# Patient Record
Sex: MALE | Race: WHITE | NOT HISPANIC OR LATINO | Employment: FULL TIME | URBAN - METROPOLITAN AREA
[De-identification: names, ages, dates, MRNs, and addresses within clinical notes are randomized per-mention and may not be internally consistent; named-entity substitution may affect disease eponyms.]

---

## 2017-12-18 ENCOUNTER — APPOINTMENT (OUTPATIENT)
Dept: RADIOLOGY | Facility: MEDICAL CENTER | Age: 47
End: 2017-12-18
Payer: COMMERCIAL

## 2017-12-18 ENCOUNTER — GENERIC CONVERSION - ENCOUNTER (OUTPATIENT)
Dept: OTHER | Facility: OTHER | Age: 47
End: 2017-12-18

## 2017-12-18 DIAGNOSIS — M75.101 RIGHT ROTATOR CUFF TEAR: ICD-10-CM

## 2017-12-18 DIAGNOSIS — M54.2 CERVICALGIA: ICD-10-CM

## 2017-12-18 PROCEDURE — 73030 X-RAY EXAM OF SHOULDER: CPT

## 2017-12-18 PROCEDURE — 72040 X-RAY EXAM NECK SPINE 2-3 VW: CPT

## 2017-12-26 ENCOUNTER — GENERIC CONVERSION - ENCOUNTER (OUTPATIENT)
Dept: OTHER | Facility: OTHER | Age: 47
End: 2017-12-26

## 2018-01-23 NOTE — MISCELLANEOUS
Message  left message for the results of his shoulder and neck        Signatures   Electronically signed by : Ashley Alberto DO; Dec 26 2017 12:48PM EST                       (Author)

## 2018-01-24 VITALS
DIASTOLIC BLOOD PRESSURE: 82 MMHG | HEIGHT: 71 IN | WEIGHT: 194 LBS | TEMPERATURE: 97.6 F | SYSTOLIC BLOOD PRESSURE: 116 MMHG | BODY MASS INDEX: 27.16 KG/M2 | OXYGEN SATURATION: 99 % | HEART RATE: 103 BPM | RESPIRATION RATE: 16 BRPM

## 2018-02-08 RX ORDER — CYCLOBENZAPRINE HCL 10 MG
1 TABLET ORAL
COMMUNITY
Start: 2017-12-18

## 2018-02-08 RX ORDER — NABUMETONE 750 MG/1
1 TABLET, FILM COATED ORAL 2 TIMES DAILY
COMMUNITY
Start: 2017-12-18

## 2018-02-08 NOTE — PROGRESS NOTES
Assessment:  1  Cervical disc disorder with radiculopathy of mid-cervical region    2  Chronic right shoulder pain    3  Myofascial pain syndrome        Plan:  The patient's symptoms, history/physical are consistent with pain that is multifactorial in origin but predominantly the result of his underlying C6-7 bulging disc which is likely leading to a right-sided radiculopathy as well as concomitant right shoulder pathology likely rotator cuff tendinitis  At this time, the patient was instructed to start taking the cyclobenzaprine 10 mg at bedtime for the spasms  I will also start him in physical therapy for isometric Neck strengthening, scapular stabilization as well as massage/myofascial release which he will defer 6 weeks  He will follow back up with me in 6 weeks for re-evaluation  My impressions and treatment recommendations were discussed in detail with the patient who verbalized understanding and had no further questions  Discharge instructions were provided  I personally saw and examined the patient and I agree with the above discussed plan of care  Orders Placed This Encounter   Procedures    Ambulatory referral to Physical Therapy     Standing Status:   Future     Standing Expiration Date:   8/9/2018     Referral Priority:   Routine     Referral Type:   Physical Therapy     Referral Reason:   Specialty Services Required     Requested Specialty:   Physical Therapy     Number of Visits Requested:   1     Expiration Date:   2/9/2019     New Medications Ordered This Visit   Medications    cyclobenzaprine (FLEXERIL) 10 mg tablet     Sig: Take 1 tablet by mouth daily at bedtime as needed for muscle spasms     nabumetone (RELAFEN) 750 mg tablet     Sig: Take 1 tablet by mouth Twice daily    sertraline (ZOLOFT) 50 mg tablet     Sig: Take 1 tablet by mouth daily       History of Present Illness:    Maria De Jesus Hernandez is a 49 y o  male who presents for consultation in regards to neck pain   Symptoms have been present for 28 years related to a remote history of an injury in 200  Pain is moderate to severe rated 8 to 10/10 on a numeric rating scale and felt nearly constantly but worst in the morning  Symptoms are burning, sharp, dull, aching, cramping in his neck and radiates into the right upper back and shoulder with numbness  Symptoms are aggravated with turning his head and lying down  There is no change with exercise relaxation  Treatment history has included heat/ice which provides moderate relief  He has tried over-the-counter analgesics without relief  He was prescribed cyclobenzaprine and nabumetone by Dr Satnam Adams but he is not taking either medication  He has been referred here for further treatment  I have personally reviewed and/or updated the patient's past medical history, past surgical history, family history, social history, current medications, allergies, and vital signs today  Review of Systems:    Review of Systems   Constitutional: Negative for fever and unexpected weight change  HENT: Negative for trouble swallowing  Eyes: Negative for visual disturbance  Respiratory: Negative for shortness of breath and wheezing  Cardiovascular: Negative for chest pain and palpitations  Gastrointestinal: Negative for constipation, diarrhea, nausea and vomiting  Endocrine: Negative for cold intolerance, heat intolerance and polydipsia  Genitourinary: Negative for difficulty urinating and frequency  Musculoskeletal: Positive for arthralgias  Negative for joint swelling and myalgias  Skin: Negative for rash  Neurological: Positive for dizziness  Negative for seizures, syncope, weakness and headaches  Memory Loss   Hematological: Does not bruise/bleed easily  Psychiatric/Behavioral: Negative for dysphoric mood  Depression   All other systems reviewed and are negative        Patient Active Problem List   Diagnosis    Cervical disc disorder with radiculopathy of mid-cervical region    Chronic right shoulder pain    Depression       Past Medical History:   Diagnosis Date    Depression        History reviewed  No pertinent surgical history  No family history on file  Social History     Occupational History    Not on file  Social History Main Topics    Smoking status: Current Every Day Smoker     Types: Cigarettes    Smokeless tobacco: Never Used    Alcohol use Yes      Comment: very rarely    Drug use: Yes     Types: Marijuana      Comment: daily    Sexual activity: Not on file       No current outpatient prescriptions on file prior to visit  No current facility-administered medications on file prior to visit  Allergies   Allergen Reactions    Penicillins        Physical Exam:    /76   Pulse 72   Resp 16   Ht 5' 10" (1 778 m)   Wt 90 3 kg (199 lb)   BMI 28 55 kg/m²     Constitutional: normal, well developed, well nourished, alert, in no distress and non-toxic and no overt pain behavior  Eyes: anicteric  HEENT: grossly intact  Neck: supple, symmetric, trachea midline and no masses   Pulmonary:even and unlabored  Cardiovascular:No edema or pitting edema present  Skin:Normal without rashes or lesions and well hydrated  Psychiatric:Mood and affect appropriate  Neurologic:Cranial Nerves II-XII grossly intact  Musculoskeletal:normal   Cervical Spine Exam  Appearance:  Normal lordosis  Palpation/Tenderness:  right cervical paraspinal tenderness  right trapezium tenderness  Sensory:  no sensory deficits noted  Range of Motion:  Flexion:   Moderately limited  with pain  Extension:  Moderately limited  with pain  Lateral Flexion - Left:  No limitation  without pain  Lateral Flexion - Right:  Moderately limited  with pain  Rotation - Left:  No limitation  without pain  Rotation - Right:  Severely limited  with pain  Motor Strength:  Left Arm Flexion  5/5  Left Arm Extension  5/5  Right Arm Flexion  5/5  Right Arm Extension  5/5  Left Wrist Flexion  5/5  Left Wrist Extension  5/5  Left Finger Abduction  5/5  Right Finger Abduction  5/5  Left Pincer Grasp  5/5  Right Pincer Grasp  5/5  Reflexes:  Left Biceps:  2+   Right Biceps:  2+   Left Triceps:  2+   Right Triceps:  2+   Special Tests:  Left Spurlings:  negative  Right Spurlings  negative  Shoulder Exam    Imaging    XR CERVICAL SPINE (12/18/2017)     INDICATION: Increasing neck pain and stiffness      COMPARISON: None     VIEWS:  AP, lateral and open mouth projections     IMAGES:  4     FINDINGS:     C2-3 fusion      No evidence of fracture or subluxation       Minimal if any disc space narrowing C6-7 with small anterior osteophyte  Perhaps mild mid cervical facet arthropathy      The prevertebral soft tissues are within normal limits        The lung apices are clear  XR RIGHT SHOULDER (12/18/2017)     INDICATION:  Increasing right shoulder pain      COMPARISON: None     VIEWS:  3     IMAGES:  3     FINDINGS:     There is no acute fracture or dislocation      No degenerative changes      No lytic or blastic lesions are seen      Soft tissues are unremarkable

## 2018-02-09 ENCOUNTER — CONSULT (OUTPATIENT)
Dept: PAIN MEDICINE | Facility: CLINIC | Age: 48
End: 2018-02-09
Payer: COMMERCIAL

## 2018-02-09 VITALS
SYSTOLIC BLOOD PRESSURE: 136 MMHG | RESPIRATION RATE: 16 BRPM | HEIGHT: 70 IN | HEART RATE: 72 BPM | DIASTOLIC BLOOD PRESSURE: 76 MMHG | BODY MASS INDEX: 28.49 KG/M2 | WEIGHT: 199 LBS

## 2018-02-09 DIAGNOSIS — G89.29 CHRONIC RIGHT SHOULDER PAIN: ICD-10-CM

## 2018-02-09 DIAGNOSIS — M25.511 CHRONIC RIGHT SHOULDER PAIN: ICD-10-CM

## 2018-02-09 DIAGNOSIS — M50.120 CERVICAL DISC DISORDER WITH RADICULOPATHY OF MID-CERVICAL REGION: Primary | ICD-10-CM

## 2018-02-09 DIAGNOSIS — M79.18 MYOFASCIAL PAIN SYNDROME: ICD-10-CM

## 2018-02-09 PROBLEM — F32.A DEPRESSION: Status: ACTIVE | Noted: 2018-02-09

## 2018-02-09 PROCEDURE — 99244 OFF/OP CNSLTJ NEW/EST MOD 40: CPT | Performed by: ANESTHESIOLOGY

## 2018-02-09 NOTE — PATIENT INSTRUCTIONS
Neck Exercises   WHAT YOU NEED TO KNOW:   Why is it important to do neck exercises? Neck exercises help reduce neck pain, and improve neck movement and strength  Neck exercises also help prevent long-term neck problems  What do I need to know about neck exercises? · Do the exercises every day,  or as often as directed by your healthcare provider  · Move slowly, gently, and smoothly  Avoid fast or jerky motions  · Stand and sit the way your healthcare provider shows you  Good posture may reduce your neck pain  Check your posture often, even when you are not doing your neck exercises  How do I perform neck exercises safely? · Exercise position:  You may sit or stand while you do neck exercises  Face forward  Your shoulders should be straight and relaxed, with a good posture  · Head tilts, forward and back:  Gently bow your head and try to touch your chin to your chest  Your healthcare provider may tell you to push on the back of your neck to help bow your head  Raise your chin back to the starting position  Tilt your head back as far as possible so you are looking up at the ceiling  Your healthcare provider may tell you to lift your chin to help tilt your head back  Return your head to the starting position  · Head tilts, side to side:  Tilt your head, bringing your ear toward your shoulder  Then tilt your head toward the other shoulder  · Head turns:  Turn your head to look over your shoulder  Tilt your chin down and try to touch it to your shoulder  Do not raise your shoulder to your chin  Face forward again  Do the same on the other side  · Head rolls:  Slowly bring your chin toward your chest  Next, roll your head to the right  Your ear should be positioned over your shoulder  Hold this position for 5 seconds  Roll your head back toward your chest and to the left into the same position  Hold for 5 seconds   Gently roll your head back and around in a clockwise [place] 3 times  Next, move your head in the reverse direction (counterclockwise) in a [place] 3 times  Do not shrug your shoulders upwards while you do this exercise  When should I contact my healthcare provider? · Your pain does not get better, or gets worse  · You have questions or concerns about your condition, care, or exercise program   CARE AGREEMENT:   You have the right to help plan your care  Learn about your health condition and how it may be treated  Discuss treatment options with your caregivers to decide what care you want to receive  You always have the right to refuse treatment  The above information is an  only  It is not intended as medical advice for individual conditions or treatments  Talk to your doctor, nurse or pharmacist before following any medical regimen to see if it is safe and effective for you  © 2017 [employer] Information is for End User's use only and may not be sold, redistributed or otherwise used for commercial purposes  All illustrations and images included in CareNotes® are the copyrighted property of [employer]  or [employer]

## 2018-02-16 ENCOUNTER — EVALUATION (OUTPATIENT)
Dept: PHYSICAL THERAPY | Facility: MEDICAL CENTER | Age: 48
End: 2018-02-16
Payer: COMMERCIAL

## 2018-02-16 DIAGNOSIS — M50.120 CERVICAL DISC DISORDER WITH RADICULOPATHY OF MID-CERVICAL REGION: ICD-10-CM

## 2018-02-16 DIAGNOSIS — M25.511 CHRONIC RIGHT SHOULDER PAIN: ICD-10-CM

## 2018-02-16 DIAGNOSIS — G89.29 CHRONIC RIGHT SHOULDER PAIN: ICD-10-CM

## 2018-02-16 DIAGNOSIS — M79.18 MYOFASCIAL PAIN SYNDROME: ICD-10-CM

## 2018-02-16 PROCEDURE — G8985 CARRY GOAL STATUS: HCPCS | Performed by: PHYSICAL THERAPIST

## 2018-02-16 PROCEDURE — 97140 MANUAL THERAPY 1/> REGIONS: CPT | Performed by: PHYSICAL THERAPIST

## 2018-02-16 PROCEDURE — 97162 PT EVAL MOD COMPLEX 30 MIN: CPT | Performed by: PHYSICAL THERAPIST

## 2018-02-16 PROCEDURE — G8984 CARRY CURRENT STATUS: HCPCS | Performed by: PHYSICAL THERAPIST

## 2018-02-16 NOTE — PROGRESS NOTES
PT Evaluation     Today's date: 2018  Patient name: Kelly Ascencio  : 1970  MRN: [id number]  Referring provider: Aníbal Mondragon MD  Dx:   Encounter Diagnoses   Name Primary?  Cervical disc disorder with radiculopathy of mid-cervical region     Chronic right shoulder pain     Myofascial pain syndrome                   Assessment  Impairments: abnormal or restricted ROM, activity intolerance, impaired physical strength, lacks appropriate home exercise program and pain with function    Assessment details: Kelly Ascencio is a 49 y o  male who presents with Cervical disc disorder with radiculopathy of mid-cervical region  Chronic right shoulder pain  Myofascial pain syndrome  Patient presents alert and oriented with the above impairments  Scot Hernandez will benefit from PT to addres deficits in order to maximize and return to prior level of function  No further referral appears necessary at this time based upon examination results  Prognosis is good given HEP compliance  Please contact me if you have any questions or recommendations  Responded well to graston treatment today; Will limit visits at this time due to visit limit per insurance until further evaluation by ortho and pending additional testing  Understanding of Dx/Px/POC: excellent   Prognosis: good    Goals  Short Term Goals:   1  Pain decreased 2 ratings in 4 weeks  2  ROM increased 10* in 4 weeks  3  Strength increased 1/2 grade in 4 weeks    Long Term Goals:   1  ADLS/IADLS in related activities improved to maximal level in 8 weeks  2  Work performance improved to maximal level in 8 weeks  3  Postural control is improved to maximal level in 8 weeks  4   Abie with HEP in 8 weeks      Plan  Patient would benefit from: skilled PT  Planned modality interventions: cryotherapy  Planned therapy interventions: activity modification, flexibility, functional ROM exercises, therapeutic exercise, stretching, strengthening, postural training, patient education, neuromuscular re-education, manual therapy and home exercise program  Frequency: 1x week  Duration in weeks: 8  Treatment plan discussed with: patient        Subjective Evaluation    History of Present Illness  Mechanism of injury: Pt reports ongoing neck pain for over 20 years that has progressively worsened  He recently made an appt w/ PCP and was sent for xrays and referred to Dr Bhakti Stevens  He was then referred to PT w/ diagnosis of cervical DDD and R shoulder pain  He reports that shoulder pain has been present for the past 6 months w/ unknown cause  He does have appt scheduled w/ Dr Carrillo Jean to further evaluate shoulder pain  He presents today w/ reports of constant neck pain that increases w/ cervical rotation, sudden movements  Right shoulder pain is also constant in nature that is worse w/ reaching overhead, lifting  He denies radicular symptoms  He does c/o headaches which is unsure if related to neck pain  He c/o sleep disturbance due to pain  He is a  and job duties involve lifting bags, operating forklift, heavy lifting, pushing  Pain  Current pain ratin  At best pain ratin  At worst pain rating: 10  Progression: worsening    Patient Goals  Patient goals for therapy: decreased pain, increased motion, increased strength, independence with ADLs/IADLs and return to work          Objective     Static Posture     Comments  Seated slouched posture, forward rounded shoulders, forward head  Palpation     Additional Palpation Details  Tenderness and tightness over R Ut, levator, rhomboid, sub-occipitals      Redness and restriction w/ graston; petechiea present over UT and sub-occiptials    Active Range of Motion   Cervical/Thoracic Spine   Cervical    Flexion: 20 degrees   Extension: 12 degrees   Left lateral flexion: 12 degrees   Right lateral flexion: 5 degrees   Left rotation: 20 degrees   Right rotation: 20 degrees   Left Shoulder   Flexion: 170 degrees Abduction: 175 degrees     Right Shoulder   Flexion: 98 degrees   Abduction: 100 degrees     Passive Range of Motion     Right Shoulder   Flexion: 165 degrees   Abduction: 170 degrees   External rotation 90°: 80 degrees   Internal rotation 90°: 43 degrees     Strength/Myotome Testing     Left Shoulder     Planes of Motion   Flexion: 5   Abduction: 5   External rotation at 90°: 5   Internal rotation at 90°: 5     Right Shoulder     Planes of Motion   Flexion: 3-   Abduction: 3-   External rotation at 90°: 3   Internal rotation at 90°: 4+       Precautions: chronic neck and R shoulder pain    Daily Treatment Diary     Manual  2/16            Graston R Ut, levator, rhomboids, sub-occipitals                                                                     Exercise Diary  2/16            Table slides flexion 10 sec x10            Table slides scaption nv            pulleys nv            Cervical SB B nv            Doorway pec stretch nv            Tband rows nv            Tband shoulder extension nv                                                                                                                                                                                         Modalities

## 2018-02-22 ENCOUNTER — TRANSCRIBE ORDERS (OUTPATIENT)
Dept: ADMINISTRATIVE | Facility: HOSPITAL | Age: 48
End: 2018-02-22

## 2018-02-22 VITALS
SYSTOLIC BLOOD PRESSURE: 133 MMHG | HEART RATE: 88 BPM | DIASTOLIC BLOOD PRESSURE: 88 MMHG | HEIGHT: 71 IN | BODY MASS INDEX: 26.52 KG/M2 | WEIGHT: 189.4 LBS

## 2018-02-22 DIAGNOSIS — M75.101 TEAR OF RIGHT ROTATOR CUFF, UNSPECIFIED TEAR EXTENT: Primary | ICD-10-CM

## 2018-02-22 DIAGNOSIS — M75.101 ROTATOR CUFF SYNDROME OF RIGHT SHOULDER: Primary | ICD-10-CM

## 2018-02-22 PROCEDURE — 99204 OFFICE O/P NEW MOD 45 MIN: CPT | Performed by: ORTHOPAEDIC SURGERY

## 2018-02-22 NOTE — PROGRESS NOTES
Assessment:  No diagnosis found  Patient Active Problem List   Diagnosis    Cervical disc disorder with radiculopathy of mid-cervical region    Chronic right shoulder pain    Depression           Plan       MRI suspect rotator cuff tear given his history multiple injuries to the shoulder multiple falls on the shoulder  What sounds like I a possible subluxation or dislocation in the past with a glenoid fracture that is healed the likelihood of rotator cuff tear is high will order an MRI he will come back to see us after the MRI and plan appropriately            Subjective:     Patient ID:    Chief Complaint:Ricardo Middleton 49 y o  male      HPI    Patient comes in today with regards to right shoulder  The patient reports that the pain is in the shoulder and has been going on for 3 years  Lateral aspect the shoulder The pain is rated at 7 at its best and9 at its worst   Patient has fall on the ice multiple times  The pain is described as Feels like my arm is being ripped off my body  It is worsened with use and sometimes at rest, and is made better with PT  Patient had 1 visit  The patient has taken  Flexeril and Relafen for treatment  Patient had cervical fusion in 1990  Patient also reports bilateral hand numbness  Patient had episodes in the past that sound like he may have dislocated his shoulder in the past    The following portions of the patient's history were reviewed and updated as appropriate: allergies, current medications, past family history, past social history, past surgical history and problem list         Social History     Social History    Marital status: Single     Spouse name: N/A    Number of children: N/A    Years of education: N/A     Occupational History    Not on file       Social History Main Topics    Smoking status: Current Every Day Smoker     Types: Cigarettes    Smokeless tobacco: Never Used    Alcohol use Yes      Comment: very rarely    Drug use: Yes     Types: Marijuana Comment: daily    Sexual activity: Not on file     Other Topics Concern    Not on file     Social History Narrative    No narrative on file     Past Medical History:   Diagnosis Date    Depression      History reviewed  No pertinent surgical history  Allergies   Allergen Reactions    Penicillins      Current Outpatient Prescriptions on File Prior to Visit   Medication Sig Dispense Refill    cyclobenzaprine (FLEXERIL) 10 mg tablet Take 1 tablet by mouth daily at bedtime as needed for muscle spasms       nabumetone (RELAFEN) 750 mg tablet Take 1 tablet by mouth Twice daily      sertraline (ZOLOFT) 50 mg tablet Take 1 tablet by mouth daily       No current facility-administered medications on file prior to visit  Objective:    Review of Systems   Constitutional: Negative  HENT: Negative  Eyes: Negative  Respiratory: Negative  Cardiovascular: Negative  Gastrointestinal: Negative  Negative for vomiting  Endocrine: Negative  Genitourinary: Negative  Musculoskeletal: Positive for arthralgias and myalgias  Skin: Negative  Allergic/Immunologic: Negative  Neurological: Negative  Hematological: Negative  Psychiatric/Behavioral: Negative  All other systems reviewed and are negative  Right Shoulder Exam     Range of Motion   The patient has normal right shoulder ROM  Muscle Strength   Abduction: 5/5   Internal Rotation: 5/5   External Rotation: 4/5   Supraspinatus: 4/5   Subscapularis: 5/5   Biceps: 5/5     Tests   Apprehension: negative  Cross Arm: negative  Drop Arm: negative  Hawkin's test: positive  Impingement: positive  Sulcus: absent    Other   Erythema: absent  Sensation: normal  Pulse: present      Left Shoulder Exam     Range of Motion   The patient has normal left shoulder ROM  Muscle Strength   The patient has normal left shoulder strength  Physical Exam   Constitutional: He is oriented to person, place, and time   He appears well-developed  HENT:   Head: Normocephalic  Eyes: Pupils are equal, round, and reactive to light  Neck: Neck supple  Cardiovascular: Intact distal pulses  Pulmonary/Chest: Effort normal    Abdominal: He exhibits no distension  Neurological: He is alert and oriented to person, place, and time  Skin: Skin is warm  Psychiatric: He has a normal mood and affect  Nursing note and vitals reviewed  I have personally reviewed pertinent films in PACS and my interpretation is X-ray show inferior glenoid fracture that may have healed fully remotely in the past       Portions of the record may have been created with voice recognition software   Occasional wrong word or "sound a like" substitutions may have occurred due to the inherent limitations of voice recognition software   Read the chart carefully and recognize, using context, where substitutions have occurred